# Patient Record
Sex: FEMALE | Race: BLACK OR AFRICAN AMERICAN | NOT HISPANIC OR LATINO | Employment: UNEMPLOYED | RURAL
[De-identification: names, ages, dates, MRNs, and addresses within clinical notes are randomized per-mention and may not be internally consistent; named-entity substitution may affect disease eponyms.]

---

## 2017-06-14 ENCOUNTER — HISTORICAL (OUTPATIENT)
Dept: ADMINISTRATIVE | Facility: HOSPITAL | Age: 52
End: 2017-06-14

## 2017-06-19 LAB
LAB AP COMMENTS: NORMAL
LAB AP GENERAL CAT - HISTORICAL: NORMAL
LAB AP INTERPRETATION/RESULT - HISTORICAL: NEGATIVE
LAB AP SPECIMEN ADEQUACY - HISTORICAL: NORMAL
LAB AP SPECIMEN SUBMITTED - HISTORICAL: NORMAL

## 2019-09-30 ENCOUNTER — HISTORICAL (OUTPATIENT)
Dept: ADMINISTRATIVE | Facility: HOSPITAL | Age: 54
End: 2019-09-30

## 2019-10-02 LAB
LAB AP CLINICAL INFORMATION: NORMAL
LAB AP COMMENTS: NORMAL
LAB AP GENERAL CAT - HISTORICAL: NORMAL
LAB AP INTERPRETATION/RESULT - HISTORICAL: NEGATIVE
LAB AP SPECIMEN ADEQUACY - HISTORICAL: NORMAL
LAB AP SPECIMEN SUBMITTED - HISTORICAL: NORMAL

## 2020-11-02 ENCOUNTER — HISTORICAL (OUTPATIENT)
Dept: ADMINISTRATIVE | Facility: HOSPITAL | Age: 55
End: 2020-11-02

## 2020-11-03 LAB

## 2021-06-28 ENCOUNTER — OFFICE VISIT (OUTPATIENT)
Dept: OBSTETRICS AND GYNECOLOGY | Facility: CLINIC | Age: 56
End: 2021-06-28
Payer: OTHER GOVERNMENT

## 2021-06-28 VITALS
OXYGEN SATURATION: 98 % | BODY MASS INDEX: 27.15 KG/M2 | WEIGHT: 173 LBS | HEART RATE: 62 BPM | TEMPERATURE: 98 F | SYSTOLIC BLOOD PRESSURE: 154 MMHG | RESPIRATION RATE: 18 BRPM | DIASTOLIC BLOOD PRESSURE: 77 MMHG | HEIGHT: 67 IN

## 2021-06-28 DIAGNOSIS — Z12.31 SCREENING MAMMOGRAM FOR HIGH-RISK PATIENT: ICD-10-CM

## 2021-06-28 DIAGNOSIS — Z01.419 ROUTINE GYNECOLOGICAL EXAMINATION: ICD-10-CM

## 2021-06-28 DIAGNOSIS — Z12.72 SPECIAL SCREENING FOR MALIGNANT NEOPLASMS, VAGINA: Primary | ICD-10-CM

## 2021-06-28 PROCEDURE — 88142 CYTOPATH C/V THIN LAYER: CPT | Mod: GCY | Performed by: OBSTETRICS & GYNECOLOGY

## 2021-06-28 PROCEDURE — 87624 HPV HI-RISK TYP POOLED RSLT: CPT | Mod: ,,, | Performed by: CLINICAL MEDICAL LABORATORY

## 2021-06-28 PROCEDURE — 87624 HUMAN PAPILLOMAVIRUS (HPV): ICD-10-PCS | Mod: ,,, | Performed by: CLINICAL MEDICAL LABORATORY

## 2021-06-28 PROCEDURE — 99396 PR PREVENTIVE VISIT,EST,40-64: ICD-10-PCS | Mod: ,,, | Performed by: OBSTETRICS & GYNECOLOGY

## 2021-06-28 PROCEDURE — 99396 PREV VISIT EST AGE 40-64: CPT | Mod: ,,, | Performed by: OBSTETRICS & GYNECOLOGY

## 2021-06-28 RX ORDER — OMEPRAZOLE 40 MG/1
40 CAPSULE, DELAYED RELEASE ORAL EVERY MORNING
COMMUNITY
Start: 2021-04-09

## 2021-06-28 RX ORDER — POTASSIUM CHLORIDE 750 MG/1
10 CAPSULE, EXTENDED RELEASE ORAL 2 TIMES DAILY
COMMUNITY
Start: 2021-06-09

## 2021-06-28 RX ORDER — GABAPENTIN 100 MG/1
100 CAPSULE ORAL 3 TIMES DAILY
COMMUNITY
Start: 2021-04-09

## 2021-06-28 RX ORDER — CLONAZEPAM 1 MG/1
1 TABLET ORAL 2 TIMES DAILY
COMMUNITY
Start: 2021-06-08

## 2021-06-28 RX ORDER — BISOPROLOL FUMARATE AND HYDROCHLOROTHIAZIDE 2.5; 6.25 MG/1; MG/1
1 TABLET ORAL DAILY
COMMUNITY
Start: 2021-04-12

## 2021-06-29 LAB
GH SERPL-MCNC: NORMAL NG/ML
INSULIN SERPL-ACNC: NORMAL U[IU]/ML
LAB AP CLINICAL INFORMATION: NORMAL
LAB AP GYN INTERPRETATION: NEGATIVE
LAB AP PAP DISCLAIMER COMMENTS: NORMAL
RENIN PLAS-CCNC: NORMAL NG/ML/H

## 2021-07-02 LAB
HPV 16: NEGATIVE
HPV 18: NEGATIVE
HPV OTHER: NEGATIVE

## 2021-07-12 ENCOUNTER — OFFICE VISIT (OUTPATIENT)
Dept: GASTROENTEROLOGY | Facility: CLINIC | Age: 56
End: 2021-07-12
Payer: OTHER GOVERNMENT

## 2021-07-12 VITALS
OXYGEN SATURATION: 98 % | DIASTOLIC BLOOD PRESSURE: 69 MMHG | SYSTOLIC BLOOD PRESSURE: 145 MMHG | HEART RATE: 75 BPM | WEIGHT: 175 LBS | BODY MASS INDEX: 27.47 KG/M2 | HEIGHT: 67 IN

## 2021-07-12 DIAGNOSIS — K59.00 CONSTIPATION, UNSPECIFIED CONSTIPATION TYPE: ICD-10-CM

## 2021-07-12 DIAGNOSIS — K21.9 GASTROESOPHAGEAL REFLUX DISEASE, UNSPECIFIED WHETHER ESOPHAGITIS PRESENT: Primary | ICD-10-CM

## 2021-07-12 DIAGNOSIS — K62.5 RECTAL BLEEDING: ICD-10-CM

## 2021-07-12 PROCEDURE — 99214 PR OFFICE/OUTPT VISIT, EST, LEVL IV, 30-39 MIN: ICD-10-PCS | Mod: ,,, | Performed by: NURSE PRACTITIONER

## 2021-07-12 PROCEDURE — 99214 OFFICE O/P EST MOD 30 MIN: CPT | Mod: ,,, | Performed by: NURSE PRACTITIONER

## 2021-07-12 RX ORDER — CHOLECALCIFEROL (VITAMIN D3) 25 MCG
1000 TABLET ORAL DAILY
COMMUNITY

## 2021-07-19 ENCOUNTER — TELEPHONE (OUTPATIENT)
Dept: GASTROENTEROLOGY | Facility: CLINIC | Age: 56
End: 2021-07-19

## 2021-08-17 ENCOUNTER — HOSPITAL ENCOUNTER (OUTPATIENT)
Dept: GASTROENTEROLOGY | Facility: HOSPITAL | Age: 56
Discharge: HOME | End: 2021-08-17
Attending: NURSE PRACTITIONER
Payer: OTHER GOVERNMENT

## 2021-08-17 ENCOUNTER — ANESTHESIA (OUTPATIENT)
Dept: GASTROENTEROLOGY | Facility: HOSPITAL | Age: 56
End: 2021-08-17
Payer: OTHER GOVERNMENT

## 2021-08-17 ENCOUNTER — ANESTHESIA EVENT (OUTPATIENT)
Dept: GASTROENTEROLOGY | Facility: HOSPITAL | Age: 56
End: 2021-08-17
Payer: OTHER GOVERNMENT

## 2021-08-17 VITALS
DIASTOLIC BLOOD PRESSURE: 59 MMHG | WEIGHT: 177 LBS | HEART RATE: 53 BPM | SYSTOLIC BLOOD PRESSURE: 166 MMHG | OXYGEN SATURATION: 97 % | BODY MASS INDEX: 27.72 KG/M2 | RESPIRATION RATE: 9 BRPM | TEMPERATURE: 98 F

## 2021-08-17 DIAGNOSIS — R13.19 ESOPHAGEAL DYSPHAGIA: ICD-10-CM

## 2021-08-17 DIAGNOSIS — K21.9 GASTROESOPHAGEAL REFLUX DISEASE, UNSPECIFIED WHETHER ESOPHAGITIS PRESENT: ICD-10-CM

## 2021-08-17 DIAGNOSIS — K44.9 HH (HIATUS HERNIA): ICD-10-CM

## 2021-08-17 PROCEDURE — C1889 IMPLANT/INSERT DEVICE, NOC: HCPCS

## 2021-08-17 PROCEDURE — D9220A PRA ANESTHESIA: ICD-10-PCS | Mod: ,,, | Performed by: NURSE ANESTHETIST, CERTIFIED REGISTERED

## 2021-08-17 PROCEDURE — 88342 IMHCHEM/IMCYTCHM 1ST ANTB: CPT | Mod: 26,,, | Performed by: PATHOLOGY

## 2021-08-17 PROCEDURE — 37000008 HC ANESTHESIA 1ST 15 MINUTES

## 2021-08-17 PROCEDURE — 88305 SURGICAL PATHOLOGY: ICD-10-PCS | Mod: 26,,, | Performed by: PATHOLOGY

## 2021-08-17 PROCEDURE — 88305 TISSUE EXAM BY PATHOLOGIST: CPT | Mod: 26,,, | Performed by: PATHOLOGY

## 2021-08-17 PROCEDURE — 37000009 HC ANESTHESIA EA ADD 15 MINS

## 2021-08-17 PROCEDURE — 43450 DILATE ESOPHAGUS 1/MULT PASS: CPT

## 2021-08-17 PROCEDURE — 88305 TISSUE EXAM BY PATHOLOGIST: CPT | Mod: SUR | Performed by: INTERNAL MEDICINE

## 2021-08-17 PROCEDURE — 27201423 OPTIME MED/SURG SUP & DEVICES STERILE SUPPLY

## 2021-08-17 PROCEDURE — D9220A PRA ANESTHESIA: Mod: ,,, | Performed by: NURSE ANESTHETIST, CERTIFIED REGISTERED

## 2021-08-17 PROCEDURE — 43239 EGD BIOPSY SINGLE/MULTIPLE: CPT

## 2021-08-17 PROCEDURE — 63600175 PHARM REV CODE 636 W HCPCS: Performed by: NURSE ANESTHETIST, CERTIFIED REGISTERED

## 2021-08-17 PROCEDURE — 88342 SURGICAL PATHOLOGY: ICD-10-PCS | Mod: 26,,, | Performed by: PATHOLOGY

## 2021-08-17 PROCEDURE — 25000003 PHARM REV CODE 250: Performed by: NURSE ANESTHETIST, CERTIFIED REGISTERED

## 2021-08-17 RX ORDER — PROPOFOL 10 MG/ML
VIAL (ML) INTRAVENOUS
Status: DISCONTINUED | OUTPATIENT
Start: 2021-08-17 | End: 2021-08-17

## 2021-08-17 RX ORDER — LIDOCAINE HYDROCHLORIDE 20 MG/ML
INJECTION, SOLUTION EPIDURAL; INFILTRATION; INTRACAUDAL; PERINEURAL
Status: DISCONTINUED | OUTPATIENT
Start: 2021-08-17 | End: 2021-08-17

## 2021-08-17 RX ORDER — HYDROCORTISONE ACETATE 25 MG/1
25 SUPPOSITORY RECTAL 2 TIMES DAILY
Qty: 12 SUPPOSITORY | Refills: 0 | Status: SHIPPED | OUTPATIENT
Start: 2021-08-17 | End: 2021-08-23

## 2021-08-17 RX ORDER — SODIUM CHLORIDE 0.9 % (FLUSH) 0.9 %
10 SYRINGE (ML) INJECTION
Status: DISCONTINUED | OUTPATIENT
Start: 2021-08-17 | End: 2021-08-18 | Stop reason: HOSPADM

## 2021-08-17 RX ADMIN — LIDOCAINE HYDROCHLORIDE 80 MG: 20 INJECTION, SOLUTION INTRAVENOUS at 08:08

## 2021-08-17 RX ADMIN — PROPOFOL 100 MG: 10 INJECTION, EMULSION INTRAVENOUS at 08:08

## 2021-08-17 RX ADMIN — SODIUM CHLORIDE: 9 INJECTION, SOLUTION INTRAVENOUS at 08:08

## 2021-08-18 LAB
ESTROGEN SERPL-MCNC: NORMAL PG/ML
LAB AP GROSS DESCRIPTION: NORMAL
LAB AP LABORATORY NOTES: NORMAL
T3RU NFR SERPL: NORMAL %

## 2021-08-19 ENCOUNTER — TELEPHONE (OUTPATIENT)
Dept: GASTROENTEROLOGY | Facility: CLINIC | Age: 56
End: 2021-08-19

## 2021-08-23 ENCOUNTER — TELEPHONE (OUTPATIENT)
Dept: GASTROENTEROLOGY | Facility: CLINIC | Age: 56
End: 2021-08-23

## 2021-08-25 ENCOUNTER — TELEPHONE (OUTPATIENT)
Dept: GASTROENTEROLOGY | Facility: CLINIC | Age: 56
End: 2021-08-25

## 2022-04-10 ENCOUNTER — HOSPITAL ENCOUNTER (EMERGENCY)
Facility: HOSPITAL | Age: 57
Discharge: HOME | End: 2022-04-10

## 2022-04-10 VITALS
DIASTOLIC BLOOD PRESSURE: 79 MMHG | WEIGHT: 178 LBS | SYSTOLIC BLOOD PRESSURE: 167 MMHG | OXYGEN SATURATION: 96 % | BODY MASS INDEX: 27.94 KG/M2 | TEMPERATURE: 98 F | RESPIRATION RATE: 18 BRPM | HEART RATE: 87 BPM | HEIGHT: 67 IN

## 2022-04-10 DIAGNOSIS — R73.09 ELEVATED GLUCOSE: ICD-10-CM

## 2022-04-10 DIAGNOSIS — K59.00 CONSTIPATION, UNSPECIFIED CONSTIPATION TYPE: Primary | ICD-10-CM

## 2022-04-10 DIAGNOSIS — E87.6 HYPOKALEMIA: ICD-10-CM

## 2022-04-10 DIAGNOSIS — R10.9 ABDOMINAL PAIN: ICD-10-CM

## 2022-04-10 LAB
ALBUMIN SERPL BCP-MCNC: 3.5 G/DL (ref 3.5–5)
ALBUMIN/GLOB SERPL: 1.1 {RATIO}
ALP SERPL-CCNC: 124 U/L (ref 46–118)
ALT SERPL W P-5'-P-CCNC: 25 U/L (ref 13–56)
AMYLASE SERPL-CCNC: 77 U/L (ref 25–115)
ANION GAP SERPL CALCULATED.3IONS-SCNC: 14 MMOL/L (ref 7–16)
AST SERPL W P-5'-P-CCNC: 17 U/L (ref 15–37)
BACTERIA #/AREA URNS HPF: ABNORMAL /HPF
BASOPHILS # BLD AUTO: 0 K/UL (ref 0–0.2)
BASOPHILS NFR BLD AUTO: 0 % (ref 0–1)
BILIRUB SERPL-MCNC: 0.2 MG/DL (ref 0–1.2)
BILIRUB UR QL STRIP: NEGATIVE
BUN SERPL-MCNC: 16 MG/DL (ref 7–18)
BUN/CREAT SERPL: 12 (ref 6–20)
CALCIUM SERPL-MCNC: 8.9 MG/DL (ref 8.5–10.1)
CHLORIDE SERPL-SCNC: 100 MMOL/L (ref 98–107)
CLARITY UR: ABNORMAL
CO2 SERPL-SCNC: 28 MMOL/L (ref 21–32)
COLOR UR: YELLOW
CREAT SERPL-MCNC: 1.39 MG/DL (ref 0.55–1.02)
DIFFERENTIAL METHOD BLD: ABNORMAL
EOSINOPHIL # BLD AUTO: 0.1 K/UL (ref 0–0.5)
EOSINOPHIL NFR BLD AUTO: 1.1 % (ref 1–4)
EOSINOPHIL NFR BLD MANUAL: 2 % (ref 1–4)
ERYTHROCYTE [DISTWIDTH] IN BLOOD BY AUTOMATED COUNT: 13.4 % (ref 11.5–14.5)
GLOBULIN SER-MCNC: 3.1 G/DL (ref 2–4)
GLUCOSE SERPL-MCNC: 214 MG/DL (ref 74–106)
GLUCOSE UR STRIP-MCNC: NEGATIVE MG/DL
HCT VFR BLD AUTO: 44 % (ref 38–47)
HGB BLD-MCNC: 14.8 G/DL (ref 12–16)
HYALINE CASTS #/AREA URNS LPF: ABNORMAL /LPF
KETONES UR STRIP-SCNC: ABNORMAL MG/DL
LEUKOCYTE ESTERASE UR QL STRIP: NEGATIVE
LIPASE SERPL-CCNC: 289 U/L (ref 73–393)
LYMPHOCYTES # BLD AUTO: 1.97 K/UL (ref 1–4.8)
LYMPHOCYTES NFR BLD AUTO: 20.9 % (ref 27–41)
LYMPHOCYTES NFR BLD MANUAL: 23 % (ref 27–41)
MCH RBC QN AUTO: 29.4 PG (ref 27–31)
MCHC RBC AUTO-ENTMCNC: 33.6 G/DL (ref 32–36)
MCV RBC AUTO: 87.5 FL (ref 80–96)
MONOCYTES # BLD AUTO: 0.41 K/UL (ref 0–0.8)
MONOCYTES NFR BLD AUTO: 4.3 % (ref 2–6)
MONOCYTES NFR BLD MANUAL: 3 % (ref 2–6)
MPC BLD CALC-MCNC: 10.6 FL (ref 9.4–12.4)
MUCOUS THREADS #/AREA URNS HPF: ABNORMAL /HPF
NEUTROPHILS # BLD AUTO: 6.96 K/UL (ref 1.8–7.7)
NEUTROPHILS NFR BLD AUTO: 73.7 % (ref 53–65)
NEUTS SEG NFR BLD MANUAL: 72 % (ref 50–62)
NITRITE UR QL STRIP: NEGATIVE
NRBC BLD MANUAL-RTO: ABNORMAL %
PH UR STRIP: 5.5 PH UNITS
PLATELET # BLD AUTO: 238 K/UL (ref 150–400)
PLATELET MORPHOLOGY: NORMAL
POTASSIUM SERPL-SCNC: 3.3 MMOL/L (ref 3.5–5.1)
PROT SERPL-MCNC: 6.6 G/DL (ref 6.4–8.2)
PROT UR QL STRIP: NEGATIVE
RBC # BLD AUTO: 5.03 M/UL (ref 4.2–5.4)
RBC # UR STRIP: ABNORMAL /UL
RBC #/AREA URNS HPF: ABNORMAL /HPF
RBC MORPH BLD: NORMAL
SODIUM SERPL-SCNC: 139 MMOL/L (ref 136–145)
SP GR UR STRIP: >=1.03
SQUAMOUS #/AREA URNS LPF: ABNORMAL /LPF
UROBILINOGEN UR STRIP-ACNC: 0.2 MG/DL
WBC # BLD AUTO: 9.44 K/UL (ref 4.5–11)
WBC #/AREA URNS HPF: ABNORMAL /HPF

## 2022-04-10 PROCEDURE — 85025 COMPLETE CBC W/AUTO DIFF WBC: CPT | Performed by: NURSE PRACTITIONER

## 2022-04-10 PROCEDURE — 36415 COLL VENOUS BLD VENIPUNCTURE: CPT | Performed by: NURSE PRACTITIONER

## 2022-04-10 PROCEDURE — 80053 COMPREHEN METABOLIC PANEL: CPT | Performed by: NURSE PRACTITIONER

## 2022-04-10 PROCEDURE — 99282 PR EMERGENCY DEPT VISIT,LEVEL II: ICD-10-PCS | Mod: ,,, | Performed by: NURSE PRACTITIONER

## 2022-04-10 PROCEDURE — 81001 URINALYSIS AUTO W/SCOPE: CPT | Performed by: NURSE PRACTITIONER

## 2022-04-10 PROCEDURE — 82150 ASSAY OF AMYLASE: CPT | Performed by: NURSE PRACTITIONER

## 2022-04-10 PROCEDURE — 83690 ASSAY OF LIPASE: CPT | Performed by: NURSE PRACTITIONER

## 2022-04-10 PROCEDURE — 99282 EMERGENCY DEPT VISIT SF MDM: CPT | Mod: ,,, | Performed by: NURSE PRACTITIONER

## 2022-04-10 PROCEDURE — 99284 EMERGENCY DEPT VISIT MOD MDM: CPT

## 2022-04-10 NOTE — DISCHARGE INSTRUCTIONS
-Restart colace at home as previously directed.  -Take miralax one capful in 8 oz of fluid daily for constipation  -Increase water and fiber intake. Limit carbs in diet due to elevated glucose. Follow up with primary care provider for further evaluation of elevated glucose.  -Take potassium pills as previously prescribed by primary care provider  -Avoid NSAIDs

## 2022-04-10 NOTE — ED PROVIDER NOTES
Encounter Date: 4/10/2022       History     Chief Complaint   Patient presents with    Abdominal Pain     X 2 days; described as bloating     Roberto Carlos Fabian is a 57 y/o female who presents to the ER with complaint of  Left lower and right upper abdominal pain and bloating x 2 day. Reports abdominal pain is an 8 on 1-10 pain scale. Had 1 episode of vomiting last night, denies any N/V today. Denies any fever, dysuria or diarrhea. Reports has constipation. Took MiraLax 2 days ago, had small hard bm this morning.   Drinks alcohol 1 drink about every 3 months.   Reports history of pancreatitis (), hiatal hernia. Surgical history total hysterectomy, cholecystecomy          Review of patient's allergies indicates:  No Known Allergies  Past Medical History:   Diagnosis Date    Abnormal Pap smear of cervix     Anxiety     Enlarged thyroid     Esophageal dysphagia 2021    HH (hiatus hernia) 2021     Past Surgical History:   Procedure Laterality Date    CERVIX LESION DESTRUCTION       SECTION      Heart Monitor      HYSTERECTOMY      TUBAL LIGATION       Family History   Problem Relation Age of Onset    Stroke Father     Heart attack Father     COPD Father     Diabetes Mother     Kidney failure Mother     Colon cancer Brother     Lung cancer Sister     Diabetes Brother     Stroke Brother     No Known Problems Brother     Hypertension Sister     Hypertension Sister     Hypertension Sister     Hypertension Sister     Hypertension Sister     Hypertension Sister     No Known Problems Son     No Known Problems Son      Social History     Tobacco Use    Smoking status: Current Every Day Smoker     Types: Vaping w/o nicotine    Smokeless tobacco: Never Used   Substance Use Topics    Alcohol use: Not Currently    Drug use: Never     Review of Systems   Constitutional: Positive for chills. Negative for activity change, fatigue and fever.   HENT: Negative.    Respiratory:  Negative.  Negative for cough and shortness of breath.    Cardiovascular: Negative.  Negative for chest pain.   Gastrointestinal: Positive for abdominal pain, constipation and vomiting. Negative for diarrhea and nausea.        Abdominal bloating. Vomited x 1 last night.   Genitourinary: Negative for difficulty urinating, dysuria, frequency, vaginal bleeding and vaginal discharge.   Musculoskeletal: Positive for back pain.        Chronic low back pain related to bulging disc   Skin: Negative.    Neurological: Negative.  Negative for weakness and numbness.   Hematological: Negative.    Psychiatric/Behavioral: Negative.        Physical Exam     Initial Vitals [04/10/22 1126]   BP Pulse Resp Temp SpO2   (!) 167/79 87 18 98.1 °F (36.7 °C) 96 %      MAP       --         Physical Exam    Nursing note and vitals reviewed.  Constitutional: She appears well-developed and well-nourished. She is cooperative. She does not have a sickly appearance. She does not appear ill. No distress.   HENT:   Head: Normocephalic.   Mouth/Throat: Mucous membranes are normal.   Pulmonary/Chest: Effort normal and breath sounds normal.   Abdominal: Abdomen is soft. Bowel sounds are normal. There is abdominal tenderness in the right upper quadrant and left lower quadrant.   No right CVA tenderness.  No left CVA tenderness. There is no rebound and no guarding.     Lymphadenopathy:     She has no cervical adenopathy.   Neurological: She is alert and oriented to person, place, and time. No cranial nerve deficit.   Skin: Skin is warm, dry and intact. Capillary refill takes less than 2 seconds. No rash noted.   Psychiatric: She has a normal mood and affect. Her speech is normal and behavior is normal.         Medical Screening Exam   See Full Note    ED Course   Procedures  Labs Reviewed   COMPREHENSIVE METABOLIC PANEL - Abnormal; Notable for the following components:       Result Value    Potassium 3.3 (*)     Glucose 214 (*)     Creatinine 1.39 (*)      Alk Phos 124 (*)     eGFR 42 (*)     All other components within normal limits   URINALYSIS, REFLEX TO URINE CULTURE - Abnormal; Notable for the following components:    Clarity, UA Slightly Cloudy (*)     Blood, UA Small (*)     Specific Gravity, UA >=1.030 (*)     All other components within normal limits   CBC WITH DIFFERENTIAL - Abnormal; Notable for the following components:    Neutrophils % 73.7 (*)     Lymphocytes % 20.9 (*)     All other components within normal limits   MANUAL DIFFERENTIAL - Abnormal; Notable for the following components:    Segmented Neutrophils, Man % 72 (*)     Lymphocytes, Man % 23 (*)     All other components within normal limits   URINALYSIS, MICROSCOPIC - Abnormal; Notable for the following components:    RBC, UA 10-15 (*)     Bacteria, UA Few (*)     Squamous Epithelial Cells, UA Moderate (*)     Mucus, UA Few (*)     Hyaline Casts, UA 0-2 (*)     All other components within normal limits   AMYLASE - Normal   LIPASE - Normal   CBC W/ AUTO DIFFERENTIAL    Narrative:     The following orders were created for panel order CBC auto differential.  Procedure                               Abnormality         Status                     ---------                               -----------         ------                     CBC with Differential[[id number]]        Abnormal            Final result               Manual Differential[[id number]]          Abnormal            Final result                 Please view results for these tests on the individual orders.          Imaging Results          X-Ray Abdomen AP 1 View (KUB) (Final result)  Result time 04/10/22 12:19:36    Final result by Syd Esquivel MD (04/10/22 12:19:36)                 Impression:      No acute radiographic abnormality in the abdomen.    A mild degree of fecal stasis/constipation is suspected.    Place of service: [facility]– [place]      Electronically signed by: Syd  Alvin  Date:    04/10/2022  Time:    12:19             Narrative:    EXAMINATION:  XR ABDOMEN AP 1 VIEW    CLINICAL HISTORY:  Unspecified abdominal pain    COMPARISON:  None    FINDINGS:  Bowel gas pattern is nonspecific and within normal limits. No abnormally dilated small bowel loops to suggest obstruction. There is no free air within the abdomen. There is a moderate amount of stool seen throughout the colon.  Cholecystectomy clips are noted in the right upper quadrant.    No abnormal calcific densities project within the abdomen.    Lung bases are predominantly clear. There is no acute osseous abnormality.                                 Medications - No data to display  Medical Decision Making:   ED Management:  Potassium 3.3, but patient states that she has not been taking potassium pills as prescribed.  Amylase, lipase, liver enzymes and WBC wnl. UA negative for infection. KUB revealed fecal stasis. Based upon patient's symptoms and physical exam I believe her abdominal pain is related to constipation. Patient to restart colace at home, start miralax and increase water. She agreed to follow up with PCP regarding elevated glucose and creatinine. Instructed her to avoid NSAIDs.                   Clinical Impression:   Final diagnoses:  [R10.9] Abdominal pain  [K59.00] Constipation, unspecified constipation type (Primary)  [R73.09] Elevated glucose  [E87.6] Hypokalemia          ED Disposition Condition    Discharge Stable        ED Prescriptions     None        Follow-up Information     Follow up With Specialties Details Why Contact Info    Dee Carr MD Family Medicine Call in 1 day to schedule follow up in clinic for emergency department visit [address] [facility] [address]  [phone]             Kayy Leonardo, FELICITA  04/10/22 3175

## 2022-07-05 ENCOUNTER — OFFICE VISIT (OUTPATIENT)
Dept: OBSTETRICS AND GYNECOLOGY | Facility: CLINIC | Age: 57
End: 2022-07-05
Payer: COMMERCIAL

## 2022-07-05 VITALS
BODY MASS INDEX: 27.15 KG/M2 | HEIGHT: 67 IN | RESPIRATION RATE: 16 BRPM | SYSTOLIC BLOOD PRESSURE: 152 MMHG | DIASTOLIC BLOOD PRESSURE: 80 MMHG | WEIGHT: 173 LBS | HEART RATE: 62 BPM

## 2022-07-05 DIAGNOSIS — Z01.419 ROUTINE GYNECOLOGICAL EXAMINATION: ICD-10-CM

## 2022-07-05 DIAGNOSIS — Z72.51 HIGH RISK SEXUAL BEHAVIOR, UNSPECIFIED TYPE: ICD-10-CM

## 2022-07-05 DIAGNOSIS — Z87.410 HISTORY OF CERVICAL DYSPLASIA: ICD-10-CM

## 2022-07-05 DIAGNOSIS — Z12.72 SPECIAL SCREENING FOR MALIGNANT NEOPLASMS, VAGINA: Primary | ICD-10-CM

## 2022-07-05 PROCEDURE — 3079F PR MOST RECENT DIASTOLIC BLOOD PRESSURE 80-89 MM HG: ICD-10-PCS | Mod: CPTII,,, | Performed by: OBSTETRICS & GYNECOLOGY

## 2022-07-05 PROCEDURE — 3008F BODY MASS INDEX DOCD: CPT | Mod: CPTII,,, | Performed by: OBSTETRICS & GYNECOLOGY

## 2022-07-05 PROCEDURE — 88142 CYTOPATH C/V THIN LAYER: CPT | Mod: GCY | Performed by: OBSTETRICS & GYNECOLOGY

## 2022-07-05 PROCEDURE — 1159F PR MEDICATION LIST DOCUMENTED IN MEDICAL RECORD: ICD-10-PCS | Mod: CPTII,,, | Performed by: OBSTETRICS & GYNECOLOGY

## 2022-07-05 PROCEDURE — 3077F PR MOST RECENT SYSTOLIC BLOOD PRESSURE >= 140 MM HG: ICD-10-PCS | Mod: CPTII,,, | Performed by: OBSTETRICS & GYNECOLOGY

## 2022-07-05 PROCEDURE — 3008F PR BODY MASS INDEX (BMI) DOCUMENTED: ICD-10-PCS | Mod: CPTII,,, | Performed by: OBSTETRICS & GYNECOLOGY

## 2022-07-05 PROCEDURE — 3079F DIAST BP 80-89 MM HG: CPT | Mod: CPTII,,, | Performed by: OBSTETRICS & GYNECOLOGY

## 2022-07-05 PROCEDURE — 99386 PR PREVENTIVE VISIT,NEW,40-64: ICD-10-PCS | Mod: ,,, | Performed by: OBSTETRICS & GYNECOLOGY

## 2022-07-05 PROCEDURE — 3077F SYST BP >= 140 MM HG: CPT | Mod: CPTII,,, | Performed by: OBSTETRICS & GYNECOLOGY

## 2022-07-05 PROCEDURE — 87624 HUMAN PAPILLOMAVIRUS (HPV): ICD-10-PCS | Mod: ,,, | Performed by: CLINICAL MEDICAL LABORATORY

## 2022-07-05 PROCEDURE — 1159F MED LIST DOCD IN RCRD: CPT | Mod: CPTII,,, | Performed by: OBSTETRICS & GYNECOLOGY

## 2022-07-05 PROCEDURE — 99386 PREV VISIT NEW AGE 40-64: CPT | Mod: ,,, | Performed by: OBSTETRICS & GYNECOLOGY

## 2022-07-05 PROCEDURE — 87624 HPV HI-RISK TYP POOLED RSLT: CPT | Mod: ,,, | Performed by: CLINICAL MEDICAL LABORATORY

## 2022-07-05 NOTE — PROGRESS NOTES
"CC: Well woman exam    Roberto Carlos Fabain is a 56 y.o. female  presents for well woman exam.  LMP: No LMP recorded. Patient has had a hysterectomy..  No issues, problems, or complaints.      Past Medical History:   Diagnosis Date    Abnormal Pap smear of cervix     Anxiety     Enlarged thyroid     Esophageal dysphagia 2021    HH (hiatus hernia) 2021     Past Surgical History:   Procedure Laterality Date    CERVIX LESION DESTRUCTION       SECTION      Heart Monitor      HYSTERECTOMY      TUBAL LIGATION       Social History     Socioeconomic History    Marital status:    Tobacco Use    Smoking status: Current Every Day Smoker     Types: Vaping w/o nicotine    Smokeless tobacco: Never Used   Substance and Sexual Activity    Alcohol use: Not Currently    Drug use: Never    Sexual activity: Yes     Partners: Male     Birth control/protection: See Surgical Hx     Family History   Problem Relation Age of Onset    Stroke Father     Heart attack Father     COPD Father     Diabetes Mother     Kidney failure Mother     Colon cancer Brother     Lung cancer Sister     Diabetes Brother     Stroke Brother     No Known Problems Brother     Hypertension Sister     Hypertension Sister     Hypertension Sister     Hypertension Sister     Hypertension Sister     Hypertension Sister     No Known Problems Son     No Known Problems Son      OB History        2    Para   2    Term   2            AB        Living   2       SAB        IAB        Ectopic        Multiple        Live Births   2                 BP (!) 152/80 (BP Location: Right arm, Patient Position: Sitting, BP Method: Medium (Manual))   Pulse 62   Resp 16   Ht 5' 7" (1.702 m)   Wt 78.5 kg (173 lb)   BMI 27.10 kg/m²       ROS:  GENERAL: Denies weight gain or weight loss. Feeling well overall.   SKIN: Denies rash or lesions.   HEAD: Denies head injury or headache.   NODES: Denies enlarged lymph " nodes.   CHEST: Denies chest pain or shortness of breath.   CARDIOVASCULAR: Denies palpitations or left sided chest pain.   ABDOMEN: No abdominal pain, constipation, diarrhea, nausea, vomiting or rectal bleeding.   URINARY: No frequency, dysuria, hematuria, or burning on urination.  REPRODUCTIVE: See HPI.   BREASTS: The patient performs breast self-examination and denies pain, lumps, or nipple discharge.   HEMATOLOGIC: No easy bruisability or excessive bleeding.   MUSCULOSKELETAL: Denies joint pain or swelling.   NEUROLOGIC: Denies syncope or weakness.   PSYCHIATRIC: Denies depression, anxiety or mood swings.    PHYSICAL EXAM:  APPEARANCE: Well nourished, well developed, in no acute distress.  AFFECT: WNL, alert and oriented x 3  SKIN: No acne or hirsutism  NECK: Neck symmetric without masses or thyromegaly  NODES: No inguinal, cervical, axillary, or femoral lymph node enlargement  CHEST: Good respiratory effect  ABDOMEN: Soft.  No tenderness or masses.  No hepatosplenomegaly.  No hernias.  BREASTS: Symmetrical, no skin changes or visible lesions.  No palpable masses, nipple discharge bilaterally.  PELVIC: Normal external genitalia without lesions.  Normal hair distribution.  Adequate perineal body, normal urethral meatus.  Vagina moist and well rugated without lesions or discharge.  Cervix  and uterus surgically absent. Adnexa without masses or tenderness.    EXTREMITIES: No edema.    Special screening for malignant neoplasms, vagina  -     ThinPrep Pap Test; Future; Expected date: 07/05/2022    Routine gynecological examination  -     ThinPrep Pap Test; Future; Expected date: 07/05/2022    High risk sexual behavior, unspecified type    History of cervical dysplasia            Patient was counseled today on A.C.S. Pap guidelines and recommendations for yearly pelvic exams, mammograms and monthly self breast exams; to see her PCP for other health maintenance.   Exercise regimen encouraged  Healthy food choices  encouraged  Questions answered to desired level of satisfaction  Verbalized understanding to all information and instructions    Follow up in about 1 year (around 7/5/2023) for Annual Exam.

## 2022-07-09 LAB
HPV 16: NEGATIVE
HPV 18: NEGATIVE
HPV OTHER: NEGATIVE

## 2024-12-30 DIAGNOSIS — Z12.11 COLON CANCER SCREENING: Primary | ICD-10-CM

## 2025-04-28 ENCOUNTER — TELEPHONE (OUTPATIENT)
Dept: GASTROENTEROLOGY | Facility: CLINIC | Age: 60
End: 2025-04-28
Payer: OTHER GOVERNMENT